# Patient Record
Sex: MALE | Race: WHITE | URBAN - METROPOLITAN AREA
[De-identification: names, ages, dates, MRNs, and addresses within clinical notes are randomized per-mention and may not be internally consistent; named-entity substitution may affect disease eponyms.]

---

## 2017-01-03 NOTE — TELEPHONE ENCOUNTER
HCTZ      Last Written Prescription Date: 09/08/16  Last Fill Quantity: 30, # refills: 3  Last Office Visit with [facility], [facility] or [facility] prescribing provider: 09/26/16       POTASSIUM   Date Value Ref Range Status   09/08/2016 3.9 3.4 - 5.3 mmol/L Final     CREATININE   Date Value Ref Range Status   09/08/2016 0.88 0.66 - 1.25 mg/dL Final     BP Readings from Last 3 Encounters:   11/04/16 140/88   09/26/16 138/74   09/12/16 165/92     Due for BP check,  unable to approve per medication refill protocol. Forwarded to provider.  Doris Lucas, [facility]  [phone]

## 2017-01-04 RX ORDER — HYDROCHLOROTHIAZIDE 25 MG/1
25 TABLET ORAL DAILY
Qty: 30 TABLET | Refills: 2 | Status: SHIPPED | OUTPATIENT
Start: 2017-01-04 | End: 2017-06-05

## 2017-06-05 NOTE — TELEPHONE ENCOUNTER
Hydrochlorothiazide 25 mg      Last Written Prescription Date: 1/4/2017  Last Fill Quantity: 30, # refills: 2  Last Office Visit with [facility], P or [facility] prescribing provider: 9/26/2016       Potassium   Date Value Ref Range Status   09/08/2016 3.9 3.4 - 5.3 mmol/L Final     Creatinine   Date Value Ref Range Status   09/08/2016 0.88 0.66 - 1.25 mg/dL Final     BP Readings from Last 3 Encounters:   11/04/16 140/88   09/26/16 138/74   09/12/16 (!) 165/92

## 2017-06-06 NOTE — TELEPHONE ENCOUNTER
Routing refill request to provider for review/approval because:  Appears to be a break in medication - should have been out in beginning of April.     Connie Cárdenas, RN, BSN

## 2017-06-07 RX ORDER — HYDROCHLOROTHIAZIDE 25 MG/1
25 TABLET ORAL DAILY
Qty: 30 TABLET | Refills: 2 | Status: SHIPPED | OUTPATIENT
Start: 2017-06-07 | End: 2017-11-20

## 2017-07-17 ENCOUNTER — TELEPHONE (OUTPATIENT)
Dept: FAMILY MEDICINE | Facility: OTHER | Age: 52
End: 2017-07-17

## 2017-07-17 NOTE — TELEPHONE ENCOUNTER
Summary:    Patient is due/failing the following:   BP CHECK and FOLLOW UP    Action needed:   Patient needs office visit for hypertension follow up .    Type of outreach:    Phone, spoke to patient.  patient declined due to not having health insurance    Questions for provider review:    None                                                                                                                                    Christina Mojica       Chart routed to Care Team .      Panel Management Review      Patient has the following on his problem list:     Hypertension   Last three blood pressure readings:  BP Readings from Last 3 Encounters:   11/04/16 140/88   09/26/16 138/74   09/12/16 (!) 165/92     Blood pressure: FAILED    HTN Guidelines:  Age 18-59 BP range:  Less than 140/90  Age 60-85 with Diabetes:  Less than 140/90  Age 60-85 without Diabetes:  less than 150/90      Composite cancer screening  Chart review shows that this patient is due/due soon for the following None

## 2017-09-28 DIAGNOSIS — N52.9 ERECTILE DYSFUNCTION, UNSPECIFIED ERECTILE DYSFUNCTION TYPE: ICD-10-CM

## 2017-09-28 NOTE — TELEPHONE ENCOUNTER
Routing refill request to provider for review/approval because:  Patient need to establish with a new provider  Yelitza Vogel RN

## 2017-09-28 NOTE — TELEPHONE ENCOUNTER
Pending Prescriptions:                       Disp   Refills    sildenafil (VIAGRA) 100 MG tablet         6 tabl*1            Sig: Take 0.5-1 tablets ( mg) by mouth daily as           needed Take 30 min to 4 hours before intercourse.           Never use with nitroglycerin, terazosin or           doxazosin.          Last Written Prescription Date: 9/26/2016  Last Fill Quantity: 6,  # refills: 1   Last Office Visit with [facility], P or [facility] prescribing provider: 9/26/2016

## 2017-09-29 RX ORDER — SILDENAFIL 100 MG/1
50-100 TABLET, FILM COATED ORAL DAILY PRN
Qty: 6 TABLET | Refills: 1 | OUTPATIENT
Start: 2017-09-29

## 2017-09-29 NOTE — TELEPHONE ENCOUNTER
Pt hasn't been seen for a year.  Has been asked to follow up for HTN.  Cannot refill without follow up.  Needs to establish with new provider.

## 2017-10-23 ENCOUNTER — ALLIED HEALTH/NURSE VISIT (OUTPATIENT)
Dept: FAMILY MEDICINE | Facility: OTHER | Age: 52
End: 2017-10-23
Payer: COMMERCIAL

## 2017-10-23 VITALS — DIASTOLIC BLOOD PRESSURE: 82 MMHG | SYSTOLIC BLOOD PRESSURE: 134 MMHG

## 2017-10-23 DIAGNOSIS — I10 ESSENTIAL HYPERTENSION WITH GOAL BLOOD PRESSURE LESS THAN 140/90: Primary | ICD-10-CM

## 2017-10-23 PROCEDURE — 99207 ZZC NO CHARGE NURSE ONLY: CPT

## 2017-10-23 NOTE — PROGRESS NOTES
Anastacio Hall is enrolled/participating in the retail pharmacy Blood Pressure Goals Achievement Program (BPGAP).  Anastacio Hall was evaluated at [facility] on October 23, 2017 at which time his blood pressure was:    BP Readings from Last 3 Encounters:   10/23/17 134/82   11/04/16 140/88   09/26/16 138/74     Reviewed lifestyle modifications for blood pressure control and reduction: including making healthy food choices, managing weight, getting regular exercise, smoking cessation, reducing alcohol consumption, monitoring blood pressure regularly.     Anastacio Hall       Follow-Up: BP is at goal of < 140/90mmHg (patient 18+ years of age with or without diabetes).  Recommended follow-up at pharmacy in 6 months.       Completed by: Doris Lucas RPh  [facility]  [phone]

## 2017-10-23 NOTE — MR AVS SNAPSHOT
"              After Visit Summary   10/23/2017    Anastacio Hall    MRN: [id number]           Patient Information     Date Of Birth          1965        Visit Information        Provider Department      10/23/2017 1:15 PM [facility]        Today's Diagnoses     Essential hypertension with goal blood pressure less than 140/90    -  1       Follow-ups after your visit        Who to contact     If you have questions or need follow up information about today's clinic visit or your schedule please contact [facility] directly at [phone].  Normal or non-critical lab and imaging results will be communicated to you by [email or web address], letter or phone within 4 business days after the clinic has received the results. If you do not hear from us within 7 days, please contact the clinic through [email or web address] or phone. If you have a critical or abnormal lab result, we will notify you by phone as soon as possible.  Submit refill requests through [employer] or call your pharmacy and they will forward the refill request to us. Please allow 3 business days for your refill to be completed.          Additional Information About Your Visit        MyChart Information     GroupMe lets you send messages to your doctor, view your test results, renew your prescriptions, schedule appointments and more. To sign up, go to [email or web address] . Click on \"Log in\" on the left side of the screen, which will take you to the Welcome page. Then click on \"Sign up Now\" on the right side of the page.     You will be asked to enter the access code listed below, as well as some personal information. Please follow the directions to create your username and password.     Your access code is: [email or web address]  Expires: 2018  1:21 PM     Your access code will  in 90 days. If you need help or a new code, please call your [facility] or [phone].        Care EveryWhere ID     This is your Care " EveryWhere ID. This could be used by other organizations to access your [facility] medical records  [id number]         Blood Pressure from Last 3 Encounters:   10/23/17 134/82   11/04/16 140/88   09/26/16 138/74    Weight from Last 3 Encounters:   09/26/16 219 lb 6.4 oz (99.5 kg)   09/08/16 231 lb 3.2 oz (104.9 kg)              Today, you had the following     No orders found for display       Primary Care Provider    None Specified       No primary provider on file.        Equal Access to Services     [facility]LE [facility] : Hadii libby Dupree, wachapisda luqadaha, qaybta kaalmafred carl, luis sky . So [facility] [phone].    ATENCIÓN: Si habla español, tiene a borja disposición servicios gratuitos de asistencia lingüística. Llame al [phone].    We comply with applicable federal civil rights laws and [place] laws. We do not discriminate on the basis of race, color, national origin, age, disability, sex, sexual orientation, or gender identity.            Thank you!     Thank you for choosing [facility]  for your care. Our goal is always to provide you with excellent care. Hearing back from our patients is one way we can continue to improve our services. Please take a few minutes to complete the written survey that you may receive in the mail after your visit with us. Thank you!             Your Updated Medication List - Protect others around you: Learn how to safely use, store and throw away your medicines at www.disposemymeds.org.          This list is accurate as of: 10/23/17  1:21 PM.  Always use your most recent med list.                   Brand Name Dispense Instructions for use Diagnosis    hydrochlorothiazide 25 MG tablet    HYDRODIURIL    30 tablet    Take 1 tablet (25 mg) by mouth daily    Elevated blood pressure       sildenafil 100 MG tablet    VIAGRA    6 tablet    Take 0.5-1 tablets ( mg) by mouth daily as needed for erectile dysfunction Take 30  min to 4 hours before intercourse.  Never use with nitroglycerin, terazosin or doxazosin.    Erectile dysfunction, unspecified erectile dysfunction type       varenicline 0.5 MG X 11 & 1 MG X 42 tablet    CHANTIX STARTING MONTH PAK 53 tablet    Take 0.5 mg tab daily for 3 days, then 0.5 mg tab twice daily for 4 days, then 1 mg twice daily.    Tobacco use disorder

## 2017-10-25 NOTE — TELEPHONE ENCOUNTER
Patient is calling wanting to know why this hasn't been filled? Patient states he was told he needed to come in for a blood pressure check and he did that on Monday. Please call patient st [phone].    Thank you Adela

## 2017-10-26 NOTE — TELEPHONE ENCOUNTER
Spoke to patient informed of message below from Dr. Garcia. Offered to schedule patient declined.   Charlotte Oakes CMA ([facility])

## 2017-11-09 DIAGNOSIS — N52.9 ERECTILE DYSFUNCTION, UNSPECIFIED ERECTILE DYSFUNCTION TYPE: ICD-10-CM

## 2017-11-09 NOTE — LETTER
Anastacio Hall  [address] [place] [facility] [address]    November 13, 2017      Dear Anastacio Hall    APPOINTMENT REMINDER:   Our records indicates that it is time for you to be seen for a recheck.     Your current medication request will be approved for one refill but you will need to be seen before any additional refills can be approved.  Taking care of your health is important to us, and ongoing visits with your provider are vital to your care.    We look forward to seeing you in the near future.  You may call our office at [phone] to schedule a visit.     Please disregard this notice if you have already made an appointment.      Sincerely,    [facility]     [facility]  [address] [address]  Phone: [phone]

## 2017-11-13 RX ORDER — SILDENAFIL 100 MG/1
50-100 TABLET, FILM COATED ORAL DAILY PRN
Qty: 6 TABLET | Refills: 1 | OUTPATIENT
Start: 2017-11-13

## 2017-11-13 NOTE — TELEPHONE ENCOUNTER
Routing refill request to provider for review/approval because:  Antoinette given x1 and patient did not follow up  Patient needs to be seen because it has been more than 1 year since last office visit.    Connie Cárdenas, RN, BSN

## 2017-11-20 DIAGNOSIS — I10 HYPERTENSION GOAL BP (BLOOD PRESSURE) < 140/90: Primary | ICD-10-CM

## 2017-11-22 RX ORDER — HYDROCHLOROTHIAZIDE 25 MG/1
TABLET ORAL
Qty: 30 TABLET | Refills: 0 | Status: SHIPPED | OUTPATIENT
Start: 2017-11-22

## 2017-11-22 NOTE — TELEPHONE ENCOUNTER
Requested Prescriptions   Pending Prescriptions Disp Refills     hydrochlorothiazide (HYDRODIURIL) 25 MG tablet [Pharmacy Med Name: HYDROCHLOROT 25MG   TAB] 30 tablet 2     Sig: TAKE ONE TABLET BY MOUTH ONCE DAILY    Diuretics (Including Combos) Protocol Failed    11/20/2017  2:54 PM       Failed - Recent or future visit with authorizing provider's specialty    Patient had office visit in the last year or has a visit in the next 30 days with authorizing provider.  See chart review.              Failed - Normal serum creatinine on file in past 12 months    Recent Labs   Lab Test  09/08/16   0822   CR  0.88             Failed - Normal serum potassium on file in past 12 months    Recent Labs   Lab Test  09/08/16   0822   POTASSIUM  3.9                   Failed - Normal serum sodium on file in past 12 months    Recent Labs   Lab Test  09/08/16 0822   NA  140             Passed - Blood pressure under 140/90    BP Readings from Last 3 Encounters:   10/23/17 134/82   11/04/16 140/88   09/26/16 138/74                Passed - Patient is age 18 or older      hydrochlorothiazide (HYDRODIURIL) 25 MG tablet  Routing refill request to provider for review/approval because:  Labs not current:  CMP  Break in medication   Patient needs to be seen because:  Due for physical and to establish care   Patient needs to be seen because it has been more than 1 year since last office visit.    Consuelo Booker, RN, BSN